# Patient Record
Sex: MALE | Race: ASIAN
[De-identification: names, ages, dates, MRNs, and addresses within clinical notes are randomized per-mention and may not be internally consistent; named-entity substitution may affect disease eponyms.]

---

## 2018-11-18 ENCOUNTER — HOSPITAL ENCOUNTER (EMERGENCY)
Dept: HOSPITAL 72 - EMR | Age: 43
Discharge: HOME | End: 2018-11-18
Payer: MEDICAID

## 2018-11-18 VITALS — DIASTOLIC BLOOD PRESSURE: 53 MMHG | SYSTOLIC BLOOD PRESSURE: 110 MMHG

## 2018-11-18 VITALS — DIASTOLIC BLOOD PRESSURE: 59 MMHG | SYSTOLIC BLOOD PRESSURE: 110 MMHG

## 2018-11-18 VITALS — BODY MASS INDEX: 24.44 KG/M2 | HEIGHT: 69 IN | WEIGHT: 165 LBS

## 2018-11-18 VITALS — SYSTOLIC BLOOD PRESSURE: 110 MMHG | DIASTOLIC BLOOD PRESSURE: 82 MMHG

## 2018-11-18 DIAGNOSIS — R11.2: Primary | ICD-10-CM

## 2018-11-18 DIAGNOSIS — R19.7: ICD-10-CM

## 2018-11-18 DIAGNOSIS — R10.13: ICD-10-CM

## 2018-11-18 LAB
ADD MANUAL DIFF: NO
ALBUMIN SERPL-MCNC: 3.8 G/DL (ref 3.4–5)
ALBUMIN/GLOB SERPL: 0.9 {RATIO} (ref 1–2.7)
ALP SERPL-CCNC: 68 U/L (ref 46–116)
ALT SERPL-CCNC: 58 U/L (ref 12–78)
ANION GAP SERPL CALC-SCNC: 7 MMOL/L (ref 5–15)
APPEARANCE UR: CLEAR
APTT PPP: (no result) S
AST SERPL-CCNC: 29 U/L (ref 15–37)
BASOPHILS NFR BLD AUTO: 0.7 % (ref 0–2)
BILIRUB SERPL-MCNC: 0.7 MG/DL (ref 0.2–1)
BUN SERPL-MCNC: 12 MG/DL (ref 7–18)
CALCIUM SERPL-MCNC: 8.8 MG/DL (ref 8.5–10.1)
CHLORIDE SERPL-SCNC: 102 MMOL/L (ref 98–107)
CO2 SERPL-SCNC: 30 MMOL/L (ref 21–32)
CREAT SERPL-MCNC: 0.9 MG/DL (ref 0.55–1.3)
EOSINOPHIL NFR BLD AUTO: 1.3 % (ref 0–3)
ERYTHROCYTE [DISTWIDTH] IN BLOOD BY AUTOMATED COUNT: 10.5 % (ref 11.6–14.8)
GLOBULIN SER-MCNC: 4.2 G/DL
GLUCOSE UR STRIP-MCNC: NEGATIVE MG/DL
HCT VFR BLD CALC: 40.6 % (ref 42–52)
HGB BLD-MCNC: 14.8 G/DL (ref 14.2–18)
KETONES UR QL STRIP: NEGATIVE
LEUKOCYTE ESTERASE UR QL STRIP: NEGATIVE
LYMPHOCYTES NFR BLD AUTO: 23 % (ref 20–45)
MCV RBC AUTO: 90 FL (ref 80–99)
MONOCYTES NFR BLD AUTO: 10 % (ref 1–10)
NEUTROPHILS NFR BLD AUTO: 65.1 % (ref 45–75)
NITRITE UR QL STRIP: NEGATIVE
PH UR STRIP: 7 [PH] (ref 4.5–8)
PLATELET # BLD: 285 K/UL (ref 150–450)
POTASSIUM SERPL-SCNC: 3.7 MMOL/L (ref 3.5–5.1)
PROT UR QL STRIP: NEGATIVE
RBC # BLD AUTO: 4.51 M/UL (ref 4.7–6.1)
SODIUM SERPL-SCNC: 139 MMOL/L (ref 136–145)
SP GR UR STRIP: 1.01 (ref 1–1.03)
UROBILINOGEN UR-MCNC: NORMAL MG/DL (ref 0–1)
WBC # BLD AUTO: 7.8 K/UL (ref 4.8–10.8)

## 2018-11-18 PROCEDURE — 81003 URINALYSIS AUTO W/O SCOPE: CPT

## 2018-11-18 PROCEDURE — 80053 COMPREHEN METABOLIC PANEL: CPT

## 2018-11-18 PROCEDURE — 36415 COLL VENOUS BLD VENIPUNCTURE: CPT

## 2018-11-18 PROCEDURE — 83690 ASSAY OF LIPASE: CPT

## 2018-11-18 PROCEDURE — 85025 COMPLETE CBC W/AUTO DIFF WBC: CPT

## 2018-11-18 PROCEDURE — 99283 EMERGENCY DEPT VISIT LOW MDM: CPT

## 2018-11-18 NOTE — EMERGENCY ROOM REPORT
History of Present Illness


General


Chief Complaint:  Nausea, Vomiting, and Diarrhea


Source:  Patient





Present Illness


HPI


Is a 43-year-old male with no past medical history.  He presents with chief 

complaint abdominal pain with nausea vomiting and diarrhea.  Onset was 5 days 

ago.  He said he ate some old chicken in the fridge.  The next day he had 

profuse diarrhea while at work and at home.  He took some Imodium and I got 

better.  Since then he has nausea and today has been persistent vomiting.  His 

pain is epigastric area.  No fever or chills.  Vomiting is nonbloody 

nonbilious.  Felt weak and tired.  Sleeping a lot.  Denies any sick contact.  

Nothing made it better.  Eating and drinking makes it worse.


Allergies:  


Coded Allergies:  


     NO KNOWN DRUG ALLERGIES (Verified  Allergy, 12/26/13)





Patient History


Past Medical History:  see triage record, old chart reviewed


Past Surgical History:  none


Pertinent Family History:  none


Social History:  Denies: smoking


Immunizations:  other


Reviewed Nursing Documentation:  PMH: Agreed; PSxH: Agreed





Nursing Documentation-PMH


Past Medical History:  No Stated History


Hx Cancer:  No


Hx Neurological Problems:  Yes - HEADACHES





Review of Systems


Constitutional:  Reports: weakness


Eye:  Denies: eye pain, blurred vision


ENT:  Denies: ear pain, nose congestion, throat swelling


Respiratory:  Denies: cough, shortness of breath


Cardiovascular:  Denies: chest pain, palpitations


Gastrointestinal:  Reports: abdominal pain, diarrhea, nausea, vomiting


Musculoskeletal:  Denies: back pain, joint pain


Skin:  Denies: rash


Neurological:  Denies: headache, numbness


Endocrine:  Denies: increased thirst, increased urine


Hematologic/Lymphatic:  Denies: easy bruising


All Other Systems:  negative except mentioned in HPI





Physical Exam





Vital Signs








  Date Time  Temp Pulse Resp B/P (MAP) Pulse Ox O2 Delivery O2 Flow Rate FiO2


 


11/18/18 22:09 98.2 72 16 116/80 99 Room Air  





vitals normal


Sp02 EP Interpretation:  reviewed, normal


General Appearance:  well appearing, no apparent distress, alert


Head:  normocephalic, atraumatic


Eyes:  bilateral eye PERRL, bilateral eye EOMI


ENT:  hearing grossly normal, normal pharynx


Neck:  full range of motion, supple, no meningismus


Respiratory:  chest non-tender, lungs clear, normal breath sounds


Cardiovascular #1:  regular rate, rhythm, no murmur


Gastrointestinal:  normal bowel sounds, non tender, no mass, no organomegaly, 

no bruit, non-distended


Musculoskeletal:  back normal, gait/station normal, normal range of motion


Psychiatric:  mood/affect normal


Skin:  warm/dry





Medical Decision Making


Diagnostic Impression:  


 Primary Impression:  


 Nausea, vomiting, and diarrhea


ER Course


With nausea vomiting diarrhea.  This is most likely an acute gastroenteritis 

rather than food poisoning since been gone for a few days.  He said she doing 

well.  Did not want IV fluid or medication.  We'll discharge home.  No evidence 

of an acute abdomen.  No evidence of any obstruction.


Lab Results Impression


labs normal





Last Vital Signs








  Date Time  Temp Pulse Resp B/P (MAP) Pulse Ox O2 Delivery O2 Flow Rate FiO2


 


11/18/18 22:09 98.2 72 16 116/80 99 Room Air  








Status:  improved


Disposition:  HOME, SELF-CARE


Condition:  Stable


Scripts


Ondansetron (Zofran) 4 Mg Tablet


4 MG ORAL Q6H PRN for Nausea & Vomiting, #10 TAB 0 Refills


   Prov: Shailesh Bhandari MD         11/18/18


Referrals:  


[facility]ERR[facility] (PCP)





Additional Instructions:  


Advance diet slowly.  Increase fluid.  Follow-up with your Dr. in 2 to 3 days 

if not better.  Return if worse.











Shailesh Bhandari MD Nov 18, 2018 22:34

## 2020-02-16 ENCOUNTER — HOSPITAL ENCOUNTER (EMERGENCY)
Dept: HOSPITAL 72 - EMR | Age: 45
LOS: 1 days | Discharge: HOME | End: 2020-02-17
Payer: SELF-PAY

## 2020-02-16 VITALS — HEIGHT: 68 IN | BODY MASS INDEX: 25.01 KG/M2 | WEIGHT: 165 LBS

## 2020-02-16 VITALS — DIASTOLIC BLOOD PRESSURE: 81 MMHG | SYSTOLIC BLOOD PRESSURE: 129 MMHG

## 2020-02-16 DIAGNOSIS — S89.92XA: Primary | ICD-10-CM
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
## 2020-02-16 PROCEDURE — 99282 EMERGENCY DEPT VISIT SF MDM: CPT

## 2020-02-16 NOTE — NUR
ED Nurse Note:



Pt walke din to ED c/o left calf pain started today at 0900. Stated that he was 
playing baseball and running at onset of pain. Pt was able to walk but with 
limitation.

## 2020-02-17 VITALS — DIASTOLIC BLOOD PRESSURE: 81 MMHG | SYSTOLIC BLOOD PRESSURE: 129 MMHG

## 2020-02-17 NOTE — EMERGENCY ROOM REPORT
History of Present Illness


General


Chief Complaint:  Pain


Source:  Patient





Present Illness


HPI


44-year-old male presents ED complaining of left calf pain.  Started yesterday 

morning while he played baseball.  Days while running he felt a pop in his left 

calf.  States he has been having intense pain since.  9 out of 10, throbbing, 

nonradiating.  Has been icing it.  Has difficulty walking.  Denies any other 

injuries.  No other aggravating relieving factors.  Denies any other associated 

symptoms


Allergies:  


Coded Allergies:  


     NO KNOWN DRUG ALLERGIES (Verified  Allergy, 12/26/13)





Patient History


Past Medical History:  none


Past Surgical History:  none


Pertinent Family History:  none


Social History:  Denies: smoking, alcohol use, drug use


Immunizations:  UTD


Reviewed Nursing Documentation:  PMH: Agreed; PSxH: Agreed





Nursing Documentation-PMH


Past Medical History:  No History, Except For


Hx Cancer:  No


Hx Neurological Problems:  Yes - HEADACHES





Review of Systems


All Other Systems:  negative except mentioned in HPI





Physical Exam





Vital Signs








  Date Time  Temp Pulse Resp B/P (MAP) Pulse Ox O2 Delivery O2 Flow Rate FiO2


 


2/16/20 23:45 97.9 65 14 129/81 (97) 98 Room Air  








Sp02 EP Interpretation:  reviewed, normal


General Appearance:  no apparent distress, alert, GCS 15, non-toxic


Head:  normocephalic


Eyes:  bilateral eye normal inspection, bilateral eye PERRL


ENT:  normal ENT inspection


Neck:  normal inspection


Respiratory:  normal inspection


Cardiovascular #1:  normal inspection


Gastrointestinal:  normal inspection


Rectal:  deferred


Genitourinary:  no CVA tenderness


Musculoskeletal:  calf tenderness - LEFT, other - negative avila sign


Neurologic:  alert, motor strength/tone normal, oriented x3, sensory intact, 

responsive, speech normal


Psychiatric:  normal inspection


Skin:  no rash


Lymphatic:  normal inspection





Procedures


Splinting


Splinting :  


   Consent:  Verbal


   Pre-Made Type:  crutches


   Pre-Proc Neuro Vasc Exam:  normal


   Post-Proc Neuro Vasc Exam:  normal


   Patient Tolerated:  Well


   Complications:  None





Medical Decision Making


Diagnostic Impression:  


 Primary Impression:  


 Injury of calf


ER Course


Hospital Course 


45 yo M presents with calf pain s/p running





Differential diagnoses include: Fracture, dislocation, sprain, contusion, 

bursitis





Clinical course


Patient placed on stretcher.  After initial history, physical exam reveals a 

male in no acute distress.  On exam there is tenderness to the left calf.  

Swollen compared to the right.  Negative Chacon test.  No bony tenderness.  

Distal pulses intact.  Sensations intact





I discussed findings with patient.  No sign of Achilles injury.  Likely 

muscular.  Given Motrin in ED.  Given crutches.  Will discharge to home.  

Nonweightbearing.  I will provide Ortho referrals





Diagnosis - injury of calf





stable and discharged to home with prescription for Motrin.  Followup with PMD/

ortho.  Return to ED if symptoms recur or worsen





Last Vital Signs








  Date Time  Temp Pulse Resp B/P (MAP) Pulse Ox O2 Delivery O2 Flow Rate FiO2


 


2/17/20 00:50 97.9 65 14 129/81 98 Room Air  








Status:  improved


Disposition:  HOME, SELF-CARE


Condition:  Stable


Scripts


Ibuprofen* (MOTRIN*) 600 Mg Tablet


600 MG ORAL Q8H PRN for For Pain, #30 TAB 0 Refills


   Prov: Jmaes Ruano MD         2/17/20


Referrals:  


Kiran Soliman MD











[facility],REFERRING (PCP)


Patient Instructions:  Muscle Strain, Easy-to-Read











James Ruano MD Feb 17, 2020 04:32

## 2020-02-17 NOTE — NUR
ED Nurse Note:



Pt cleared by ERMD for discharge.  DC instructions/prescription was given and 
explained to pt and verbalized understanding of teachings. All medical deviecs 
such as ID band removed. Pt is AAO x4, ambulatory and left with all personal 
belongings. Crutches provided.